# Patient Record
Sex: FEMALE | Employment: UNEMPLOYED | URBAN - METROPOLITAN AREA
[De-identification: names, ages, dates, MRNs, and addresses within clinical notes are randomized per-mention and may not be internally consistent; named-entity substitution may affect disease eponyms.]

---

## 2024-05-20 ENCOUNTER — APPOINTMENT (OUTPATIENT)
Dept: CARDIOLOGY | Facility: CLINIC | Age: 34
End: 2024-05-20
Payer: COMMERCIAL

## 2024-05-20 NOTE — PROGRESS NOTES
"CARDIOLOGY NEW PATIENT OFFICE VISIT    Patient:  Miguelina Chaney  YOB: 1990  MRN: [id number]       Chief Complaint/Active Symptoms:       Miguelina Chaney is a 33 y.o. female who is being seen today at the request of *** for evaluation of ***.    No chief complaint on file.      History of Present Illness:   HPI      Allergies:     Not on File     Outpatient Medications:     No current outpatient medications     Past Medical History:     No past medical history on file.    Social History:     Social History     Tobacco Use    Smoking status: Not on file    Smokeless tobacco: Not on file   Substance Use Topics    Alcohol use: Not on file    Drug use: Not on file       Family History:      No family history on file.    Review of Systems:     ROS    Objective:     There were no vitals filed for this visit.    There were no vitals filed for this visit.    Physical Examination:   Physical Exam       Lab:     CBC:   Lab Results   Component Value Date    WBC 7.6 06/17/2021    RBC 4.73 06/17/2021    HGB 12.6 06/17/2021    HCT 38.7 06/17/2021     06/17/2021        CMP:    Lab Results   Component Value Date     06/17/2021    K 4.2 06/17/2021     06/17/2021    CO2 24 06/17/2021    BUN 12 06/17/2021    CREATININE 0.77 06/17/2021    GLUCOSE 106 (H) 06/17/2021    CALCIUM 9.8 06/17/2021       Magnesium:    No results found for: \"MG\"    Lipid Profile:    No results found for: \"CHLPL\", \"TRIG\", \"HDL\", \"LDLCALC\", \"LDLDIRECT\"    TSH:    No results found for: \"TSH\"    BNP:   No results found for: \"BNP\"     PT/INR:    No results found for: \"PROTIME\", \"INR\"    HgBA1c:    Lab Results   Component Value Date    HGBA1C 6.4 (H) 03/21/2024       BMP:  Lab Results   Component Value Date     06/17/2021    K 4.2 06/17/2021     06/17/2021    CO2 24 06/17/2021    BUN 12 06/17/2021    CREATININE 0.77 06/17/2021       CBC:  Lab Results   Component Value Date    WBC 7.6 06/17/2021    RBC 4.73 06/17/2021    HGB 12.6 " "06/17/2021    HCT 38.7 06/17/2021    MCV 82 06/17/2021    MCHC 32.6 06/17/2021    RDW 13.0 06/17/2021     06/17/2021       Cardiac Enzymes:    No results found for: \"TROPHS\"    Hepatic Function Panel:    Lab Results   Component Value Date    ALKPHOS 83 06/17/2021    ALT 76 (H) 06/17/2021    AST 40 (H) 06/17/2021    PROT 7.6 06/17/2021    BILITOT 0.4 06/17/2021    BILIDIR 0.1 06/17/2021         Diagnostic Studies:     Patient was never admitted.    Radiology:     No orders to display       Assessment/Plan:     {Assess/Plan SmartLinks:53252::\"***\"}      Assessment:     {Assess/Plan SmartLinks:41044::\"***\"}    Plan:       "

## 2024-06-03 ENCOUNTER — APPOINTMENT (OUTPATIENT)
Dept: CARDIOLOGY | Facility: CLINIC | Age: 34
End: 2024-06-03
Payer: COMMERCIAL

## 2024-08-13 ENCOUNTER — APPOINTMENT (OUTPATIENT)
Dept: CARDIOLOGY | Facility: CLINIC | Age: 34
End: 2024-08-13
Payer: COMMERCIAL

## 2024-08-13 VITALS
HEART RATE: 88 BPM | HEIGHT: 66 IN | BODY MASS INDEX: 42.91 KG/M2 | DIASTOLIC BLOOD PRESSURE: 80 MMHG | SYSTOLIC BLOOD PRESSURE: 130 MMHG | WEIGHT: 267 LBS | OXYGEN SATURATION: 98 %

## 2024-08-13 DIAGNOSIS — R06.02 SHORTNESS OF BREATH: Primary | ICD-10-CM

## 2024-08-13 PROCEDURE — 3008F BODY MASS INDEX DOCD: CPT | Performed by: INTERNAL MEDICINE

## 2024-08-13 PROCEDURE — 93000 ELECTROCARDIOGRAM COMPLETE: CPT | Performed by: INTERNAL MEDICINE

## 2024-08-13 PROCEDURE — 99213 OFFICE O/P EST LOW 20 MIN: CPT | Performed by: INTERNAL MEDICINE

## 2024-08-13 PROCEDURE — 1036F TOBACCO NON-USER: CPT | Performed by: INTERNAL MEDICINE

## 2024-08-13 RX ORDER — METFORMIN HYDROCHLORIDE 500 MG/1
1 TABLET, EXTENDED RELEASE ORAL
COMMUNITY
Start: 2024-07-26 | End: 2025-01-22

## 2024-08-13 NOTE — PROGRESS NOTES
"    [facility]  Primary Care Physician: Jose Cash DO  Referring Provider/Cardiologist: n/a; self-referral     Date of Visit: 08/13/2024  4:40 PM EDT  Location of visit: [address]     HPI:   Ms. Chaney is a 33F with a PMHx sig for obesity and DM who returns to the Advanced Heart Failure clinic for evaluation.     Interval hx:   At the current time she reports ongoing exertional dyspnea. She recently underwent additional testing including CPET at [facility].       PMHx:  Obesity, DM     SocHx:   , lives in [place] with 2 children (son 3, daughter 5). Homemaker   Denies past or current tobacco, EtOH, or recreational drug use     FamHx:  Father: MI, CVA, DM, living age 62  Mother: stomach ulcers, pre-diabetes         Current Outpatient Medications   Medication Sig Dispense Refill    metFORMIN  mg 24 hr tablet Take 1 tablet (500 mg) by mouth once daily with breakfast.       No current facility-administered medications for this visit.       No Known Allergies      Visit Vitals  /80 (BP Location: Right arm, Patient Position: Sitting)   Pulse 88   Ht 1.676 m (5' 6\")   Wt 121 kg (267 lb)   SpO2 98%   BMI 43.09 kg/m²   BSA 2.37 m²        Physical Exam:  On exam Ms. Chaney appears her stated age, is alert and oriented x3, and in no acute distress. Her sclera are anicteric and her oropharynx has moist mucous membranes. Her neck is supple and without thyromegaly. The JVP is ~6 cm of water above the right atrium. Her cardiac exam has regular rhythm, normal S1, S2. No S3/4. There are no murmurs. Her lungs are clear to auscultation bilaterally and there is no dullness to percussion. Her abdomen is soft, nontender with normoactive bowel sounds. There is no HJR. The extremities are warm and without edema. The skin is dry. There is no rash present. The distal pulses are 2+ in all four extremities. Her mood and affect are appropriate for todays encounter. "       Cardiac Labs/Diagnostics:    Lab Results   Component Value Date    CREATININE 0.77 06/17/2021    BUN 12 06/17/2021     06/17/2021    K 4.2 06/17/2021     06/17/2021    CO2 24 06/17/2021        ECG (8/13/24):  Sinus tachycardia ()    CPET (6/14/24; [facility]):  In summary, in the current exercise study, the patient achieved a slightly reduced work capacity = VO2 max = 15.2 ml/kg/min, (80% of VO2 max predicted); and a normal work rate = 136W (100% predicted).     No clear premature cardiovascular limitation is noted on this study. Although the work capacity is slightly reduced, extrapolation of data to a maximal heart rate (0% reserve) would have predicted a normal work capacity. Similarly, there was no respiratory limitation with ample breathing reserve at the end of exercise, normal gas exchange, and normal measures of dead space ventilation.     ECG (11/8/22):  Sinus rhythm (HR 88), no ischemia/infarct, borderline LVH    cMRI (10/20/21):  1. No CMR evidence of myocarditis.   2. Delayed enhancement imaging is normal. No evidence of fibrosis, infiltrative disease, previous myocardial infarction, or inflammation of the left ventricular or right ventricular myocardium.   3. Normal left ventricular cavity size with preserved systolic function. EF 66%.   4. Normal right ventricular cavity size with preserved systolic function. RV EF 56%.    ECG (4/27/21):  Sinus rhythm (HR 91), no ischemia/infarct, borderline LVH     Echo (3/12/21; [facility])  Normal LV size/function       Impression/Plan:  Ms. Chaney is a 33F with a PMHx sig for obesity and DM who returns to the Advanced Heart Failure clinic for evaluation.     1) Dyspnea  No significant cardiac limitations on her most recent testing (CPET; 6/2024).      F/U: as needed      ____________________________________________________________  Kannan Hernández DO  Section of Advanced Heart Failure and Cardiac Transplantation  Division of Cardiovascular  Medicine  [facility]  [facility]

## 2025-05-21 ENCOUNTER — APPOINTMENT (OUTPATIENT)
Dept: ENDOCRINOLOGY | Facility: CLINIC | Age: 35
End: 2025-05-21
Payer: COMMERCIAL